# Patient Record
Sex: MALE | Race: WHITE | NOT HISPANIC OR LATINO | URBAN - METROPOLITAN AREA
[De-identification: names, ages, dates, MRNs, and addresses within clinical notes are randomized per-mention and may not be internally consistent; named-entity substitution may affect disease eponyms.]

---

## 2017-02-08 ENCOUNTER — FOLLOW UP ESTABLISHED (OUTPATIENT)
Dept: URBAN - METROPOLITAN AREA CLINIC 51 | Facility: CLINIC | Age: 82
End: 2017-02-08
Payer: MEDICARE

## 2017-02-08 PROCEDURE — 92014 COMPRE OPH EXAM EST PT 1/>: CPT | Performed by: OPTOMETRIST

## 2017-02-08 PROCEDURE — 92015 DETERMINE REFRACTIVE STATE: CPT | Performed by: OPTOMETRIST

## 2017-02-08 PROCEDURE — 92133 CPTRZD OPH DX IMG PST SGM ON: CPT | Performed by: OPTOMETRIST

## 2017-02-08 ASSESSMENT — VISUAL ACUITY
OS: 20/20
OD: 20/20

## 2017-02-08 ASSESSMENT — INTRAOCULAR PRESSURE
OD: 14
OS: 14

## 2017-06-14 ENCOUNTER — APPOINTMENT (RX ONLY)
Dept: URBAN - METROPOLITAN AREA CLINIC 161 | Facility: CLINIC | Age: 82
Setting detail: DERMATOLOGY
End: 2017-06-14

## 2017-06-14 DIAGNOSIS — L85.3 XEROSIS CUTIS: ICD-10-CM

## 2017-06-14 DIAGNOSIS — L82.1 OTHER SEBORRHEIC KERATOSIS: ICD-10-CM

## 2017-06-14 PROCEDURE — ? TREATMENT REGIMEN

## 2017-06-14 PROCEDURE — ? PRESCRIPTION

## 2017-06-14 PROCEDURE — 99213 OFFICE O/P EST LOW 20 MIN: CPT

## 2017-06-14 PROCEDURE — ? COUNSELING

## 2017-06-14 RX ORDER — TRIAMCINOLONE ACETONIDE 1 MG/G
CREAM TOPICAL 2
Qty: 1 | Refills: 2 | Status: ERX | COMMUNITY
Start: 2017-06-14

## 2017-06-14 RX ADMIN — TRIAMCINOLONE ACETONIDE 1: 1 CREAM TOPICAL at 00:00

## 2017-06-14 ASSESSMENT — LOCATION SIMPLE DESCRIPTION DERM
LOCATION SIMPLE: RIGHT UPPER BACK
LOCATION SIMPLE: LEFT UPPER BACK

## 2017-06-14 ASSESSMENT — LOCATION ZONE DERM: LOCATION ZONE: TRUNK

## 2017-06-14 ASSESSMENT — LOCATION DETAILED DESCRIPTION DERM
LOCATION DETAILED: LEFT SUPERIOR UPPER BACK
LOCATION DETAILED: RIGHT MEDIAL UPPER BACK

## 2017-06-14 NOTE — HPI: RASH
Is This A New Presentation, Or A Follow-Up?: Rash
Additional History: Patient states he has tried moisturizer, after bit and goldbond anti itch but nothing helped much. He states he is not currently using anything on the area.

## 2017-06-14 NOTE — PROCEDURE: TREATMENT REGIMEN
Detail Level: Simple
Otc Regimen: Use of Dove unscented when showering \\nApply Cetaphil cream several times a day every day all over. \\nApply Sarna lotion to itchy areas as needed. \\nFor Itching on face may use OTC Cortisone cream.

## 2017-08-15 ENCOUNTER — FOLLOW UP ESTABLISHED (OUTPATIENT)
Dept: URBAN - METROPOLITAN AREA CLINIC 51 | Facility: CLINIC | Age: 82
End: 2017-08-15
Payer: MEDICARE

## 2017-08-15 PROCEDURE — 92083 EXTENDED VISUAL FIELD XM: CPT | Performed by: OPTOMETRIST

## 2017-08-15 PROCEDURE — 92012 INTRM OPH EXAM EST PATIENT: CPT | Performed by: OPTOMETRIST

## 2017-08-15 ASSESSMENT — INTRAOCULAR PRESSURE
OS: 12
OD: 13

## 2017-09-19 ENCOUNTER — APPOINTMENT (RX ONLY)
Dept: URBAN - METROPOLITAN AREA CLINIC 169 | Facility: CLINIC | Age: 82
Setting detail: DERMATOLOGY
End: 2017-09-19

## 2017-09-19 DIAGNOSIS — L85.3 XEROSIS CUTIS: ICD-10-CM | Status: WELL CONTROLLED

## 2017-09-19 DIAGNOSIS — L259 CONTACT DERMATITIS AND OTHER ECZEMA, UNSPECIFIED CAUSE: ICD-10-CM

## 2017-09-19 PROBLEM — L23.9 ALLERGIC CONTACT DERMATITIS, UNSPECIFIED CAUSE: Status: ACTIVE | Noted: 2017-09-19

## 2017-09-19 PROCEDURE — ? COUNSELING

## 2017-09-19 PROCEDURE — ? PRESCRIPTION

## 2017-09-19 PROCEDURE — ? TREATMENT REGIMEN

## 2017-09-19 PROCEDURE — 99213 OFFICE O/P EST LOW 20 MIN: CPT

## 2017-09-19 RX ORDER — HYDROCORTISONE VALERATE 2 MG/G
CREAM TOPICAL BID
Qty: 1 | Refills: 1 | Status: ERX | COMMUNITY
Start: 2017-09-19

## 2017-09-19 RX ADMIN — HYDROCORTISONE VALERATE 1: 2 CREAM TOPICAL at 00:00

## 2017-09-19 ASSESSMENT — LOCATION DETAILED DESCRIPTION DERM
LOCATION DETAILED: LEFT LOWER CUTANEOUS LIP
LOCATION DETAILED: LEFT CENTRAL EYEBROW
LOCATION DETAILED: RIGHT DISTAL POSTERIOR UPPER ARM
LOCATION DETAILED: RIGHT CENTRAL EYEBROW
LOCATION DETAILED: LEFT MEDIAL FOREHEAD
LOCATION DETAILED: LEFT INFERIOR CENTRAL MALAR CHEEK
LOCATION DETAILED: LEFT PROXIMAL POSTERIOR UPPER ARM
LOCATION DETAILED: RIGHT CENTRAL MALAR CHEEK

## 2017-09-19 ASSESSMENT — LOCATION ZONE DERM
LOCATION ZONE: LIP
LOCATION ZONE: FACE
LOCATION ZONE: ARM

## 2017-09-19 ASSESSMENT — LOCATION SIMPLE DESCRIPTION DERM
LOCATION SIMPLE: RIGHT UPPER ARM
LOCATION SIMPLE: LEFT FOREHEAD
LOCATION SIMPLE: LEFT EYEBROW
LOCATION SIMPLE: RIGHT CHEEK
LOCATION SIMPLE: LEFT UPPER ARM
LOCATION SIMPLE: LEFT CHEEK
LOCATION SIMPLE: RIGHT EYEBROW
LOCATION SIMPLE: LEFT LIP

## 2017-09-19 NOTE — HPI: RASH
Is This A New Presentation, Or A Follow-Up?: Rash
Additional History: Patient was given metronidazole cream by urgent care but he states he has not been using the medication because it did not help. He states he recently had the inside of his house painted and he is unsure if thing may be the cause of the outbreak.

## 2017-09-19 NOTE — PROCEDURE: TREATMENT REGIMEN
Detail Level: Simple
Initiate Treatment: Hydrocortisone Valerate twice a day
Notification: Please note that there are new Treatment Regimen plans which have an enhanced patient education handout experience.  The plans are called Treatment Regimen (Acne), Treatment Regimen (Atopic Dermatitis), Treatment Regimen (Rosacea), Treatment Regimen (Sun Protection), Treatment Regimen (Cosmetic Products), and Treatment Regimen (Xerosis).
Plan: Patient advised to be gentle when washing the face avoid scrubbing. Patient advised because the face is inflamed slight burning may occur when using the moisturizer
Otc Regimen: Patient advised to moisturize the face twice a day, Cetaphil lotion is recommended
Detail Level: Generalized
Notification: Please note that there are new Treatment Regimen plans which have an enhanced patient education handout experience.  The plans are called Treatment Regimen (Acne), Treatment Regimen (Atopic Dermatitis), Treatment Regimen (Rosacea), Treatment Regimen (Sun Protection), Treatment Regimen (Cosmetic Products), and Treatment Regimen (Xerosis).
Continue Regimen: Patient advised to continue moisturizing daily
Discontinue Regimen: Metronidazole cream and do not use Triamcinolone cream on the face

## 2017-10-24 ENCOUNTER — APPOINTMENT (RX ONLY)
Dept: URBAN - METROPOLITAN AREA CLINIC 169 | Facility: CLINIC | Age: 82
Setting detail: DERMATOLOGY
End: 2017-10-24

## 2017-10-24 DIAGNOSIS — L259 CONTACT DERMATITIS AND OTHER ECZEMA, UNSPECIFIED CAUSE: ICD-10-CM | Status: RESOLVING

## 2017-10-24 DIAGNOSIS — L85.3 XEROSIS CUTIS: ICD-10-CM

## 2017-10-24 PROBLEM — L23.9 ALLERGIC CONTACT DERMATITIS, UNSPECIFIED CAUSE: Status: ACTIVE | Noted: 2017-10-24

## 2017-10-24 PROCEDURE — 99212 OFFICE O/P EST SF 10 MIN: CPT

## 2017-10-24 PROCEDURE — ? TREATMENT REGIMEN

## 2017-10-24 PROCEDURE — ? COUNSELING

## 2017-10-24 ASSESSMENT — LOCATION SIMPLE DESCRIPTION DERM
LOCATION SIMPLE: LEFT UPPER ARM
LOCATION SIMPLE: RIGHT UPPER ARM
LOCATION SIMPLE: LEFT PRETIBIAL REGION
LOCATION SIMPLE: LEFT THIGH

## 2017-10-24 ASSESSMENT — LOCATION DETAILED DESCRIPTION DERM
LOCATION DETAILED: LEFT ANTERIOR LATERAL DISTAL THIGH
LOCATION DETAILED: LEFT PROXIMAL POSTERIOR UPPER ARM
LOCATION DETAILED: RIGHT DISTAL POSTERIOR UPPER ARM
LOCATION DETAILED: LEFT PROXIMAL PRETIBIAL REGION

## 2017-10-24 ASSESSMENT — LOCATION ZONE DERM
LOCATION ZONE: ARM
LOCATION ZONE: LEG

## 2017-10-24 NOTE — PROCEDURE: TREATMENT REGIMEN
Detail Level: Simple
Plan: Patient advised to use hydrocortisone Valerate for areas of redness if needed
Notification: Please note that there are new Treatment Regimen plans which have an enhanced patient education handout experience.  The plans are called Treatment Regimen (Acne), Treatment Regimen (Atopic Dermatitis), Treatment Regimen (Rosacea), Treatment Regimen (Sun Protection), Treatment Regimen (Cosmetic Products), and Treatment Regimen (Xerosis).
Continue Regimen: Cetaphil lotion for the face daily
Detail Level: Zone
Continue Regimen: Patient advised to continue moisturizing daily
Plan: Patient advised on areas of active rash or redness he can use the hydrocortisone cream twice a day until resolved

## 2017-12-05 ENCOUNTER — APPOINTMENT (RX ONLY)
Dept: URBAN - METROPOLITAN AREA CLINIC 169 | Facility: CLINIC | Age: 82
Setting detail: DERMATOLOGY
End: 2017-12-05

## 2017-12-05 DIAGNOSIS — L85.3 XEROSIS CUTIS: ICD-10-CM

## 2017-12-05 DIAGNOSIS — L259 CONTACT DERMATITIS AND OTHER ECZEMA, UNSPECIFIED CAUSE: ICD-10-CM

## 2017-12-05 PROBLEM — L23.9 ALLERGIC CONTACT DERMATITIS, UNSPECIFIED CAUSE: Status: ACTIVE | Noted: 2017-12-05

## 2017-12-05 PROBLEM — K21.9 GASTRO-ESOPHAGEAL REFLUX DISEASE WITHOUT ESOPHAGITIS: Status: ACTIVE | Noted: 2017-12-05

## 2017-12-05 PROCEDURE — ? COUNSELING

## 2017-12-05 PROCEDURE — 99212 OFFICE O/P EST SF 10 MIN: CPT

## 2017-12-05 PROCEDURE — ? TREATMENT REGIMEN

## 2017-12-05 ASSESSMENT — LOCATION SIMPLE DESCRIPTION DERM
LOCATION SIMPLE: LEFT PRETIBIAL REGION
LOCATION SIMPLE: LEFT CHEEK
LOCATION SIMPLE: RIGHT UPPER ARM
LOCATION SIMPLE: LEFT THIGH
LOCATION SIMPLE: LEFT UPPER ARM
LOCATION SIMPLE: LEFT CALF
LOCATION SIMPLE: RIGHT CALF

## 2017-12-05 ASSESSMENT — LOCATION DETAILED DESCRIPTION DERM
LOCATION DETAILED: LEFT PROXIMAL PRETIBIAL REGION
LOCATION DETAILED: LEFT ANTERIOR LATERAL DISTAL THIGH
LOCATION DETAILED: LEFT DISTAL CALF
LOCATION DETAILED: LEFT CENTRAL MALAR CHEEK
LOCATION DETAILED: RIGHT DISTAL POSTERIOR UPPER ARM
LOCATION DETAILED: RIGHT DISTAL CALF
LOCATION DETAILED: LEFT PROXIMAL POSTERIOR UPPER ARM

## 2017-12-05 ASSESSMENT — LOCATION ZONE DERM
LOCATION ZONE: FACE
LOCATION ZONE: ARM
LOCATION ZONE: LEG

## 2017-12-05 NOTE — PROCEDURE: TREATMENT REGIMEN
Modify Regimen: Patient advised to use moisturizer more frequently every day
Continue Regimen: The contact dermatitis appears to be resolved and now he just has dryness.  He was advised to continue Cetaphil lotion for the face frequently.
Plan: Patient advised to use hydrocortisone cream on areas of active rash until resolved, after resolved continue moisturizer
Detail Level: Zone
Notification: Please note that there are new Treatment Regimen plans which have an enhanced patient education handout experience.  The plans are called Treatment Regimen (Acne), Treatment Regimen (Atopic Dermatitis), Treatment Regimen (Rosacea), Treatment Regimen (Sun Protection), Treatment Regimen (Cosmetic Products), and Treatment Regimen (Xerosis).

## 2018-02-20 ENCOUNTER — FOLLOW UP ESTABLISHED (OUTPATIENT)
Dept: URBAN - METROPOLITAN AREA CLINIC 51 | Facility: CLINIC | Age: 83
End: 2018-02-20
Payer: MEDICARE

## 2018-02-20 PROCEDURE — 92083 EXTENDED VISUAL FIELD XM: CPT | Performed by: OPTOMETRIST

## 2018-02-20 PROCEDURE — 92014 COMPRE OPH EXAM EST PT 1/>: CPT | Performed by: OPTOMETRIST

## 2018-02-20 ASSESSMENT — VISUAL ACUITY
OD: 20/20
OS: 20/20

## 2018-02-20 ASSESSMENT — INTRAOCULAR PRESSURE
OS: 14
OD: 14

## 2018-05-15 ENCOUNTER — APPOINTMENT (RX ONLY)
Dept: URBAN - METROPOLITAN AREA CLINIC 169 | Facility: CLINIC | Age: 83
Setting detail: DERMATOLOGY
End: 2018-05-15

## 2018-05-15 DIAGNOSIS — L85.3 XEROSIS CUTIS: ICD-10-CM

## 2018-05-15 DIAGNOSIS — Z87.2 PERSONAL HISTORY OF DISEASES OF THE SKIN AND SUBCUTANEOUS TISSUE: ICD-10-CM

## 2018-05-15 DIAGNOSIS — Z71.89 OTHER SPECIFIED COUNSELING: ICD-10-CM

## 2018-05-15 DIAGNOSIS — L82.1 OTHER SEBORRHEIC KERATOSIS: ICD-10-CM

## 2018-05-15 PROBLEM — L23.7 ALLERGIC CONTACT DERMATITIS DUE TO PLANTS, EXCEPT FOOD: Status: ACTIVE | Noted: 2018-05-15

## 2018-05-15 PROCEDURE — ? PRESCRIPTION

## 2018-05-15 PROCEDURE — 99213 OFFICE O/P EST LOW 20 MIN: CPT

## 2018-05-15 PROCEDURE — ? COUNSELING

## 2018-05-15 PROCEDURE — ? TREATMENT REGIMEN

## 2018-05-15 RX ORDER — TRIAMCINOLONE ACETONIDE 1 MG/G
CREAM TOPICAL 2
Qty: 1 | Refills: 0 | Status: ERX

## 2018-05-15 ASSESSMENT — LOCATION SIMPLE DESCRIPTION DERM
LOCATION SIMPLE: LEFT PRETIBIAL REGION
LOCATION SIMPLE: RIGHT UPPER ARM
LOCATION SIMPLE: LEFT CHEEK
LOCATION SIMPLE: RIGHT CALF
LOCATION SIMPLE: RIGHT SHOULDER
LOCATION SIMPLE: LEFT CALF
LOCATION SIMPLE: LEFT UPPER ARM
LOCATION SIMPLE: LEFT THIGH
LOCATION SIMPLE: RIGHT UPPER BACK

## 2018-05-15 ASSESSMENT — LOCATION DETAILED DESCRIPTION DERM
LOCATION DETAILED: RIGHT ANTERIOR SHOULDER
LOCATION DETAILED: LEFT ANTERIOR LATERAL DISTAL THIGH
LOCATION DETAILED: RIGHT DISTAL POSTERIOR UPPER ARM
LOCATION DETAILED: LEFT DISTAL CALF
LOCATION DETAILED: RIGHT MID-UPPER BACK
LOCATION DETAILED: LEFT PROXIMAL PRETIBIAL REGION
LOCATION DETAILED: LEFT INFERIOR LATERAL MALAR CHEEK
LOCATION DETAILED: LEFT PROXIMAL POSTERIOR UPPER ARM
LOCATION DETAILED: RIGHT DISTAL CALF

## 2018-05-15 ASSESSMENT — LOCATION ZONE DERM
LOCATION ZONE: LEG
LOCATION ZONE: FACE
LOCATION ZONE: ARM
LOCATION ZONE: TRUNK

## 2018-05-15 NOTE — PROCEDURE: TREATMENT REGIMEN
Notification: Please note that there are new Treatment Regimen plans which have an enhanced patient education handout experience.  The plans are called Treatment Regimen (Acne), Treatment Regimen (Atopic Dermatitis), Treatment Regimen (Rosacea), Treatment Regimen (Sun Protection), Treatment Regimen (Cosmetic Products), and Treatment Regimen (Xerosis).
Modify Regimen: Patient advised to use moisturizer more frequently every day, he is still not moisturizing enough to keep this under control
Detail Level: Zone
Plan: Patient advised to use triamcinolone cream sparingly for flares of active rash

## 2018-08-21 ENCOUNTER — FOLLOW UP ESTABLISHED (OUTPATIENT)
Dept: URBAN - METROPOLITAN AREA CLINIC 51 | Facility: CLINIC | Age: 83
End: 2018-08-21
Payer: MEDICARE

## 2018-08-21 DIAGNOSIS — H01.021 SQUAMOUS BLEPHARITIS OF RIGHT UPPER LID: Primary | ICD-10-CM

## 2018-08-21 DIAGNOSIS — H40.033 ANATOMICAL NARROW ANGLE, BILATERAL: ICD-10-CM

## 2018-08-21 PROCEDURE — 92012 INTRM OPH EXAM EST PATIENT: CPT | Performed by: OPTOMETRIST

## 2018-08-21 RX ORDER — ERYTHROMYCIN 5 MG/G
OINTMENT OPHTHALMIC
Qty: 1 | Refills: 1 | Status: INACTIVE
Start: 2018-08-21 | End: 2018-08-21

## 2018-08-21 ASSESSMENT — INTRAOCULAR PRESSURE
OS: 13
OD: 11

## 2019-02-19 ENCOUNTER — FOLLOW UP ESTABLISHED (OUTPATIENT)
Dept: URBAN - METROPOLITAN AREA CLINIC 51 | Facility: CLINIC | Age: 84
End: 2019-02-19
Payer: MEDICARE

## 2019-02-19 DIAGNOSIS — H43.813 VITREOUS DEGENERATION, BILATERAL: ICD-10-CM

## 2019-02-19 PROCEDURE — 92083 EXTENDED VISUAL FIELD XM: CPT | Performed by: OPTOMETRIST

## 2019-02-19 PROCEDURE — 92134 CPTRZ OPH DX IMG PST SGM RTA: CPT | Performed by: OPTOMETRIST

## 2019-02-19 PROCEDURE — 92014 COMPRE OPH EXAM EST PT 1/>: CPT | Performed by: OPTOMETRIST

## 2019-02-19 ASSESSMENT — VISUAL ACUITY
OS: 20/25
OD: 20/20

## 2019-02-19 ASSESSMENT — INTRAOCULAR PRESSURE
OD: 13
OS: 13

## 2019-02-19 ASSESSMENT — KERATOMETRY
OD: 44.70
OS: 44.48

## 2020-02-25 ENCOUNTER — FOLLOW UP ESTABLISHED (OUTPATIENT)
Dept: URBAN - METROPOLITAN AREA CLINIC 51 | Facility: CLINIC | Age: 85
End: 2020-02-25
Payer: MEDICARE

## 2020-02-25 DIAGNOSIS — H40.013 OPEN ANGLE WITH BORDERLINE FINDINGS, LOW RISK, BILATERAL: Primary | ICD-10-CM

## 2020-02-25 DIAGNOSIS — H52.4 PRESBYOPIA: ICD-10-CM

## 2020-02-25 DIAGNOSIS — H47.323 DRUSEN OF OPTIC DISC, BILATERAL: ICD-10-CM

## 2020-02-25 PROCEDURE — 92014 COMPRE OPH EXAM EST PT 1/>: CPT | Performed by: OPTOMETRIST

## 2020-02-25 PROCEDURE — 92015 DETERMINE REFRACTIVE STATE: CPT | Performed by: OPTOMETRIST

## 2020-02-25 PROCEDURE — 92134 CPTRZ OPH DX IMG PST SGM RTA: CPT | Performed by: OPTOMETRIST

## 2020-02-25 ASSESSMENT — KERATOMETRY
OD: 45.45
OS: 44.64

## 2020-02-25 ASSESSMENT — INTRAOCULAR PRESSURE
OD: 14
OS: 14

## 2020-02-25 ASSESSMENT — VISUAL ACUITY
OS: 20/20
OD: 20/20

## 2021-03-02 ENCOUNTER — FOLLOW UP ESTABLISHED (OUTPATIENT)
Dept: URBAN - METROPOLITAN AREA CLINIC 51 | Facility: CLINIC | Age: 86
End: 2021-03-02
Payer: MEDICARE

## 2021-03-02 PROCEDURE — 92133 CPTRZD OPH DX IMG PST SGM ON: CPT | Performed by: OPTOMETRIST

## 2021-03-02 PROCEDURE — 92014 COMPRE OPH EXAM EST PT 1/>: CPT | Performed by: OPTOMETRIST

## 2021-03-02 ASSESSMENT — INTRAOCULAR PRESSURE
OS: 12
OD: 10

## 2021-03-02 ASSESSMENT — KERATOMETRY
OD: 44.62
OS: 44.63

## 2021-03-02 ASSESSMENT — VISUAL ACUITY
OS: 20/20
OD: 20/20

## 2022-03-03 ENCOUNTER — OFFICE VISIT (OUTPATIENT)
Dept: URBAN - METROPOLITAN AREA CLINIC 51 | Facility: CLINIC | Age: 87
End: 2022-03-03
Payer: MEDICARE

## 2022-03-03 DIAGNOSIS — H35.363 DRUSEN (DEGENERATIVE) OF MACULA, BILATERAL: Primary | ICD-10-CM

## 2022-03-03 DIAGNOSIS — H04.123 DRY EYE SYNDROME OF BILATERAL LACRIMAL GLANDS: ICD-10-CM

## 2022-03-03 DIAGNOSIS — H02.831 DERMATOCHALASIS OF RIGHT UPPER EYELID: ICD-10-CM

## 2022-03-03 DIAGNOSIS — H02.834 DERMATOCHALASIS OF LEFT UPPER EYELID: ICD-10-CM

## 2022-03-03 PROCEDURE — 92133 CPTRZD OPH DX IMG PST SGM ON: CPT | Performed by: OPTOMETRIST

## 2022-03-03 PROCEDURE — 99214 OFFICE O/P EST MOD 30 MIN: CPT | Performed by: OPTOMETRIST

## 2022-03-03 PROCEDURE — 92134 CPTRZ OPH DX IMG PST SGM RTA: CPT | Performed by: OPTOMETRIST

## 2022-03-03 ASSESSMENT — KERATOMETRY
OD: 44.85
OS: 44.67

## 2022-03-03 ASSESSMENT — INTRAOCULAR PRESSURE
OS: 11
OD: 11

## 2022-03-03 ASSESSMENT — VISUAL ACUITY
OD: 20/20
OS: 20/20

## 2022-03-03 NOTE — IMPRESSION/PLAN
Impression: Open angle with borderline findings, low risk, bilateral
Pachs 604 /598 S/P LPI OD 08/07/15 // OS 09/11/15 Plan: d/c drops 05/26/2016, IOP remains low and stable, will remain off drops S/P LPI OD 08/07/15 // OS 09/11/15 IOPs - 11 OU no meds OCT RNFL abn OD , wnl OS - disc drusen - stable no progression 
disc drusen inf nasal defect long standing no progression ** no need for further VF

## 2022-03-03 NOTE — IMPRESSION/PLAN
Impression: Drusen of optic disc, bilateral: H47.323. Plan: RNFL today- abn OU 
no changes. 
will continue to monitor

## 2022-03-03 NOTE — IMPRESSION/PLAN
Impression: Tear film insufficiency of bilateral lacrimal glands Plan: Recommend use:  Artificial tears QID OU

## 2023-03-07 ENCOUNTER — OFFICE VISIT (OUTPATIENT)
Dept: URBAN - METROPOLITAN AREA CLINIC 51 | Facility: CLINIC | Age: 88
End: 2023-03-07
Payer: MEDICARE

## 2023-03-07 DIAGNOSIS — H47.323 DRUSEN OF OPTIC DISC, BILATERAL: Primary | ICD-10-CM

## 2023-03-07 DIAGNOSIS — H35.363 DRUSEN (DEGENERATIVE) OF MACULA, BILATERAL: ICD-10-CM

## 2023-03-07 DIAGNOSIS — H52.4 PRESBYOPIA: ICD-10-CM

## 2023-03-07 DIAGNOSIS — H04.123 DRY EYE SYNDROME OF BILATERAL LACRIMAL GLANDS: ICD-10-CM

## 2023-03-07 DIAGNOSIS — H40.013 OPEN ANGLE WITH BORDERLINE FINDINGS, LOW RISK, BILATERAL: ICD-10-CM

## 2023-03-07 PROCEDURE — 92134 CPTRZ OPH DX IMG PST SGM RTA: CPT | Performed by: OPTOMETRIST

## 2023-03-07 PROCEDURE — 92133 CPTRZD OPH DX IMG PST SGM ON: CPT | Performed by: OPTOMETRIST

## 2023-03-07 PROCEDURE — 99214 OFFICE O/P EST MOD 30 MIN: CPT | Performed by: OPTOMETRIST

## 2023-03-07 ASSESSMENT — INTRAOCULAR PRESSURE
OS: 13
OD: 13

## 2023-03-07 ASSESSMENT — VISUAL ACUITY
OD: 20/20
OS: 20/25

## 2023-03-07 NOTE — IMPRESSION/PLAN
Impression: Drusen (degenerative) of macula, bilateral: H35.363. Plan: Patient educated regarding findings. Recommend patient take an [facility]'s formula multiple vitamin daily. Observation is all that is indicated at this time. 
Macular OCT today

## 2023-03-07 NOTE — IMPRESSION/PLAN
Impression: Open angle with borderline findings, low risk, bilateral
Pachs 604 /598 S/P LPI OD 08/07/15 // OS 09/11/15 Plan: d/c drops 05/26/2016, IOP remains low and stable, will remain off drops S/P LPI OD 08/07/15 // OS 09/11/15 IOP stable with no meds OCT RNFL abn OD , wnl OS - disc drusen - stable no progression 
disc drusen inf nasal defect long standing no progression ** no need for further VF

## 2024-03-11 ENCOUNTER — OFFICE VISIT (OUTPATIENT)
Dept: URBAN - METROPOLITAN AREA CLINIC 51 | Facility: CLINIC | Age: 89
End: 2024-03-11
Payer: COMMERCIAL

## 2024-03-11 DIAGNOSIS — H52.4 PRESBYOPIA: ICD-10-CM

## 2024-03-11 DIAGNOSIS — H53.2 DIPLOPIA: ICD-10-CM

## 2024-03-11 DIAGNOSIS — H35.363 DRUSEN (DEGENERATIVE) OF MACULA, BILATERAL: ICD-10-CM

## 2024-03-11 DIAGNOSIS — H47.323 DRUSEN OF OPTIC DISC, BILATERAL: Primary | ICD-10-CM

## 2024-03-11 PROCEDURE — 99214 OFFICE O/P EST MOD 30 MIN: CPT | Performed by: OPTOMETRIST

## 2024-03-11 PROCEDURE — 92060 SENSORIMOTOR EXAMINATION: CPT | Performed by: OPTOMETRIST

## 2024-03-11 PROCEDURE — 92134 CPTRZ OPH DX IMG PST SGM RTA: CPT | Performed by: OPTOMETRIST

## 2024-03-11 ASSESSMENT — VISUAL ACUITY
OD: 20/20
OS: 20/20

## 2024-03-11 ASSESSMENT — INTRAOCULAR PRESSURE
OD: 12
OS: 12

## 2024-03-11 ASSESSMENT — KERATOMETRY
OS: 45.13
OD: 44.88